# Patient Record
Sex: MALE | Race: WHITE | NOT HISPANIC OR LATINO | Employment: OTHER | URBAN - METROPOLITAN AREA
[De-identification: names, ages, dates, MRNs, and addresses within clinical notes are randomized per-mention and may not be internally consistent; named-entity substitution may affect disease eponyms.]

---

## 2020-01-22 ENCOUNTER — HISTORICAL (OUTPATIENT)
Dept: ADMINISTRATIVE | Facility: HOSPITAL | Age: 65
End: 2020-01-22

## 2020-01-22 LAB
ABS NEUT (OLG): 4.9 X10(3)/MCL (ref 2.1–9.2)
ALBUMIN SERPL-MCNC: 4.2 GM/DL (ref 3.4–5)
ALBUMIN/GLOB SERPL: 2.21 {RATIO} (ref 1.5–2.5)
ALP SERPL-CCNC: 67 UNIT/L (ref 38–126)
ALT SERPL-CCNC: 8 UNIT/L (ref 7–52)
APPEARANCE, UA: CLEAR
AST SERPL-CCNC: 13 UNIT/L (ref 15–37)
BACTERIA #/AREA URNS AUTO: NORMAL /HPF
BILIRUB SERPL-MCNC: 0.3 MG/DL (ref 0.2–1)
BILIRUB UR QL STRIP: NEGATIVE MG/DL
BILIRUBIN DIRECT+TOT PNL SERPL-MCNC: 0.1 MG/DL (ref 0–0.5)
BILIRUBIN DIRECT+TOT PNL SERPL-MCNC: 0.2 MG/DL
BUN SERPL-MCNC: 15 MG/DL (ref 7–18)
CALCIUM SERPL-MCNC: 8.8 MG/DL (ref 8.5–10)
CHLORIDE SERPL-SCNC: 104 MMOL/L (ref 98–107)
CHOLEST SERPL-MCNC: 125 MG/DL (ref 0–200)
CHOLEST/HDLC SERPL: 3 {RATIO}
CO2 SERPL-SCNC: 32 MMOL/L (ref 21–32)
COLOR UR: YELLOW
CREAT SERPL-MCNC: 1.07 MG/DL (ref 0.6–1.3)
ERYTHROCYTE [DISTWIDTH] IN BLOOD BY AUTOMATED COUNT: 13.2 % (ref 11.5–17)
GLOBULIN SER-MCNC: 2 GM/DL (ref 1.2–3)
GLUCOSE (UA): NEGATIVE MG/DL
GLUCOSE SERPL-MCNC: 92 MG/DL (ref 74–106)
HCT VFR BLD AUTO: 42.8 % (ref 42–52)
HDLC SERPL-MCNC: 41 MG/DL (ref 35–60)
HGB BLD-MCNC: 14.2 GM/DL (ref 14–18)
HGB UR QL STRIP: NEGATIVE UNIT/L
KETONES UR QL STRIP: NEGATIVE MG/DL
LDLC SERPL CALC-MCNC: 70 MG/DL (ref 0–129)
LEUKOCYTE ESTERASE UR QL STRIP: NEGATIVE UNIT/L
LYMPHOCYTES # BLD AUTO: 2.1 X10(3)/MCL (ref 0.6–3.4)
LYMPHOCYTES NFR BLD AUTO: 27 % (ref 13–40)
MCH RBC QN AUTO: 29.9 PG (ref 27–31.2)
MCHC RBC AUTO-ENTMCNC: 33 GM/DL (ref 32–36)
MCV RBC AUTO: 90 FL (ref 80–94)
MONOCYTES # BLD AUTO: 0.7 X10(3)/MCL (ref 0.1–1.3)
MONOCYTES NFR BLD AUTO: 9.4 % (ref 0.1–24)
NEUTROPHILS NFR BLD AUTO: 63.6 % (ref 47–80)
NITRITE UR QL STRIP.AUTO: NEGATIVE
PH UR STRIP: 5.5 [PH]
PLATELET # BLD AUTO: 199 X10(3)/MCL (ref 130–400)
PMV BLD AUTO: 10 FL (ref 9.4–12.4)
POTASSIUM SERPL-SCNC: 4.2 MMOL/L (ref 3.5–5.1)
PROT SERPL-MCNC: 6.1 GM/DL (ref 6.4–8.2)
PROT UR QL STRIP: NEGATIVE MG/DL
PSA SERPL-MCNC: 0.26 NG/ML (ref 0–4.5)
RBC # BLD AUTO: 4.75 X10(6)/MCL (ref 4.7–6.1)
RBC #/AREA URNS HPF: NORMAL /HPF
SODIUM SERPL-SCNC: 139 MMOL/L (ref 136–145)
SP GR UR STRIP: 1.02
SQUAMOUS EPITHELIAL, UA: NORMAL /LPF
TRIGL SERPL-MCNC: 71 MG/DL (ref 30–150)
UROBILINOGEN UR STRIP-ACNC: 0.2 MG/DL
VLDLC SERPL CALC-MCNC: 14.2 MG/DL
WBC # SPEC AUTO: 7.7 X10(3)/MCL (ref 4.5–11.5)
WBC #/AREA URNS AUTO: NORMAL /[HPF]

## 2020-02-28 ENCOUNTER — HISTORICAL (OUTPATIENT)
Dept: RADIOLOGY | Facility: HOSPITAL | Age: 65
End: 2020-02-28

## 2020-07-16 ENCOUNTER — HISTORICAL (OUTPATIENT)
Dept: ADMINISTRATIVE | Facility: HOSPITAL | Age: 65
End: 2020-07-16

## 2020-07-16 LAB
T3FREE SERPL-MCNC: 2.74 PG/ML (ref 1.45–3.48)
T4 FREE SERPL-MCNC: 1.21 NG/DL (ref 0.76–1.46)
TSH SERPL-ACNC: 1.06 MIU/ML (ref 0.35–4.94)

## 2021-01-26 ENCOUNTER — HISTORICAL (OUTPATIENT)
Dept: ADMINISTRATIVE | Facility: HOSPITAL | Age: 66
End: 2021-01-26

## 2021-01-26 LAB
ABS NEUT (OLG): 5 X10(3)/MCL (ref 2.1–9.2)
ALBUMIN SERPL-MCNC: 4.1 GM/DL (ref 3.4–5)
ALBUMIN/GLOB SERPL: 1.78 {RATIO} (ref 1.5–2.5)
ALP SERPL-CCNC: 73 UNIT/L (ref 38–126)
ALT SERPL-CCNC: 10 UNIT/L (ref 7–52)
APPEARANCE, UA: CLEAR
AST SERPL-CCNC: 18 UNIT/L (ref 15–37)
BACTERIA #/AREA URNS AUTO: NORMAL /HPF
BILIRUB SERPL-MCNC: 0.5 MG/DL (ref 0.2–1)
BILIRUB UR QL STRIP: NEGATIVE MG/DL
BILIRUBIN DIRECT+TOT PNL SERPL-MCNC: 0.1 MG/DL (ref 0–0.5)
BILIRUBIN DIRECT+TOT PNL SERPL-MCNC: 0.4 MG/DL
BUN SERPL-MCNC: 16 MG/DL (ref 7–18)
CALCIUM SERPL-MCNC: 9 MG/DL (ref 8.5–10.1)
CHLORIDE SERPL-SCNC: 101 MMOL/L (ref 98–107)
CHOLEST SERPL-MCNC: 113 MG/DL (ref 0–200)
CHOLEST/HDLC SERPL: 2.4 {RATIO}
CO2 SERPL-SCNC: 32 MMOL/L (ref 21–32)
COLOR UR: YELLOW
CREAT SERPL-MCNC: 0.99 MG/DL (ref 0.6–1.3)
ERYTHROCYTE [DISTWIDTH] IN BLOOD BY AUTOMATED COUNT: 13.2 % (ref 11.5–17)
EST CREAT CLEARANCE SER (OHS): 110.78 ML/MIN
EST. AVERAGE GLUCOSE BLD GHB EST-MCNC: 111 MG/DL
GLOBULIN SER-MCNC: 2.3 GM/DL (ref 1.2–3)
GLUCOSE (UA): NEGATIVE MG/DL
GLUCOSE SERPL-MCNC: 112 MG/DL (ref 74–106)
HBA1C MFR BLD: 5.5 % (ref 4.4–6.4)
HCT VFR BLD AUTO: 42.2 % (ref 42–52)
HDLC SERPL-MCNC: 47 MG/DL (ref 35–60)
HGB BLD-MCNC: 14.4 GM/DL (ref 14–18)
HGB UR QL STRIP: NEGATIVE UNIT/L
KETONES UR QL STRIP: NEGATIVE MG/DL
LDLC SERPL CALC-MCNC: 52 MG/DL (ref 0–129)
LEUKOCYTE ESTERASE UR QL STRIP: NEGATIVE UNIT/L
LYMPHOCYTES # BLD AUTO: 1.4 X10(3)/MCL (ref 0.6–3.4)
LYMPHOCYTES NFR BLD AUTO: 19.3 % (ref 13–40)
MCH RBC QN AUTO: 31.2 PG (ref 27–31.2)
MCHC RBC AUTO-ENTMCNC: 34 GM/DL (ref 32–36)
MCV RBC AUTO: 91 FL (ref 80–94)
MONOCYTES # BLD AUTO: 0.7 X10(3)/MCL (ref 0.1–1.3)
MONOCYTES NFR BLD AUTO: 9.8 % (ref 0.1–24)
NEUTROPHILS NFR BLD AUTO: 70.9 % (ref 47–80)
NITRITE UR QL STRIP.AUTO: NEGATIVE
PH UR STRIP: 7 [PH]
PLATELET # BLD AUTO: 184 X10(3)/MCL (ref 130–400)
PMV BLD AUTO: 10.2 FL (ref 9.4–12.4)
POTASSIUM SERPL-SCNC: 4.4 MMOL/L (ref 3.5–5.1)
PROT SERPL-MCNC: 6.4 GM/DL (ref 6.4–8.2)
PROT UR QL STRIP: NEGATIVE MG/DL
PSA SERPL-MCNC: 0.2 NG/ML (ref 0–4.5)
RBC # BLD AUTO: 4.62 X10(6)/MCL (ref 4.7–6.1)
RBC #/AREA URNS HPF: NORMAL /HPF
SODIUM SERPL-SCNC: 140 MMOL/L (ref 136–145)
SP GR UR STRIP: 1.02
SQUAMOUS EPITHELIAL, UA: NORMAL /LPF
TRIGL SERPL-MCNC: 68 MG/DL (ref 30–150)
TSH SERPL-ACNC: 0.99 MIU/ML (ref 0.35–4.94)
UROBILINOGEN UR STRIP-ACNC: 1 MG/DL
VLDLC SERPL CALC-MCNC: 13.6 MG/DL
WBC # SPEC AUTO: 7.1 X10(3)/MCL (ref 4.5–11.5)
WBC #/AREA URNS AUTO: NORMAL /[HPF]

## 2021-03-02 ENCOUNTER — HISTORICAL (OUTPATIENT)
Dept: RADIOLOGY | Facility: HOSPITAL | Age: 66
End: 2021-03-02

## 2022-03-08 ENCOUNTER — HISTORICAL (OUTPATIENT)
Dept: ADMINISTRATIVE | Facility: HOSPITAL | Age: 67
End: 2022-03-08

## 2022-03-08 ENCOUNTER — HISTORICAL (OUTPATIENT)
Dept: RADIOLOGY | Facility: HOSPITAL | Age: 67
End: 2022-03-08

## 2022-04-10 ENCOUNTER — HISTORICAL (OUTPATIENT)
Dept: ADMINISTRATIVE | Facility: HOSPITAL | Age: 67
End: 2022-04-10

## 2022-04-28 VITALS
WEIGHT: 227.94 LBS | BODY MASS INDEX: 33.76 KG/M2 | DIASTOLIC BLOOD PRESSURE: 68 MMHG | HEIGHT: 69 IN | SYSTOLIC BLOOD PRESSURE: 152 MMHG

## 2022-05-03 NOTE — HISTORICAL [facility]ER
This is a historical note converted from Tori. Formatting and pictures may have been removed.  Please reference Tori for original formatting and attached multimedia. Chief Complaint  CPX/FASTING  History of Present Illness  Patient is here today for?new patient wellness CPX.? He was previously seen?a physician, Dr. Yang, and?[place]?who recently?retired unexpectedly.? I informed him that he will need to be?referred to pain management?for future prescribing of his?opioids and Lyrica. ?He is in agreement with this. ?He has had multiple?neck and back surgeries?and has been disabled since 2007 as result of these injuries.? He brought a?old chart?with records.? He also had a CVA?at age 58?which left some residual weakness.? He is taken an aspirin a day. ?He does see Dr. Walker?as his cardiologist.? I have been his wifes?physician for many years.  Review of Systems  GENERAL:?no? unexplained wt ?gain?9 lbs over holidays , no fever, fatigue, chills, night sweats or ?weakness  HEENT: no?? sore throat, ?ear pain, ?sinus pressure, ?nasal congestion, or ?rhinorrhea  VISION:?no ?vision changes, ?glaucoma, cataracts, reading glasses  CARDIAC: no? chest pain,??palpitations,?Dyspnea on exertion, ?orthopnea--Dr. Walker  RESPIRATORY:?no??cough,?wheezing, sputum production or?SOB--hx of COPD  GI: no???abdominal pain, n&v,+ ?constipation due to pain meds ,no ?diarrhea,??blood in stool or_?family history of colon cancer?_  :?no? dysuria, ?hematuria, ?frequency, urgency, ?incontinence,? testicular pain/swelling,?_?family history of prostate cancer_  [facility]:? no? myalgia, ?weakness, edema,?+ neck and back ?arthralgia, or ?joint effusion  SKIN:? No?rash, hives,?itching or?sores  NEURO:? No?headaches, + numbness and?tingling and pain in feet ,? no weakness, or ?dizziness  PSYCH:? No anxiety, depression, ?irritability, ?suicidal ideation or hallucinations  ENDO:? No ?polyuria, ?polydipsia, ?polyphagia  HEME:? No Bruising,  lymphadenopathy, bleeding disorders ?or?signs of anemia  Physical Exam  Vitals & Measurements  HR:?70(Peripheral)? BP:?112/62?  HT:?176.5?cm? WT:?104.1?kg? BMI:?33.42?  GENERAL: NAD, alert and oriented x 3  SKIN:? no rash or abnormal appearing skin lesions  HEENT:? PERRLA, EOMI, mouth wnl, throat wnl, EAC and TM wnl bilaterally  NECK:?? no lymphadenopathy, no thyroid abnormalities palpable  CHEST:? CTA bilaterally?mild bilateral wheezes, no crackles or rubs  CARDIAC:? RRR, no murmurs audible  ABDOMEN:? Soft, nontender, nondistended, NBSx4,?no rebound or guarding, no HSM  EXTREMITIES:? no clubbing, cyanosis, or edema.?? +1 DP/PT pulse bilaterally  NEURO:? no sensory or motor deficits noted. CN II-XII intact. Gait wnl.?  GENITAL: normal testes, no hernia  RECTAL: no hemorrhoids or fissures, prostate enlarged but smooth surface  Assessment/Plan  1.?Wellness examination?Z00.00  on ,?DC smoking CBC, CMP, FLP, PSA, U/A, colonoscopy??Dr Cunningham?get report,? set up low dose CT scan--30 pk yr hx, Encourage pt to increase cardiovascular exercise and attempt to obtain at least 150 minutes of moderate aerobic exercise per week or 75 minutes of vigorous aerobic exercise weekly.  Ordered:  Clinic Follow-Up Wellness, *Est. 01/22/21 3:00:00 CST, Order for future visit, Wellness examination  H/O: CVA (cerebrovascular accident)  Hypercholesterolemia  Chronic neck and back pain, HLink AFP   Medicare Subsequent Wellness PC, Wellness examination  H/O: CVA (cerebrovascular accident)  Hypercholesterolemia  Chronic neck and back pain, HLINK AMB - AFP, 01/22/20 10:19:00 CST  Schedule Diagnostics Study, Low dose CT of lung, [facility] program, 01/22/20 10:20:00 CST, Wellness examination  ?  2.?Screening for prostate cancer?Z12.5  PSA  Ordered:  Prostate Specific Antigen, Routine collect, 01/22/20 10:26:00 CST, Blood, Stop date 01/22/20 10:26:00 CST, Lab Collect, Screening for prostate cancer, 01/22/20 10:26:00  CST  ?  3.?Immunization due?Z23  flu shot -got  ?  4.?H/O: CVA (cerebrovascular accident)?Z86.73  Continue  mg daily  Ordered:  Automated Diff, Routine collect, 01/22/20 10:26:00 CST, Blood, Collected, Stop date 01/22/20 10:26:00 CST, Lab Collect, H/O: CVA (cerebrovascular accident), 01/22/20 10:26:00 CST  CBC w/ Auto Diff, Routine collect, 01/22/20 10:26:00 CST, Blood, Stop date 01/22/20 10:26:00 CST, Lab Collect, H/O: CVA (cerebrovascular accident), 01/22/20 10:26:00 CST  Clinic Follow-Up Wellness, *Est. 01/22/21 3:00:00 CST, Order for future visit, Wellness examination  H/O: CVA (cerebrovascular accident)  Hypercholesterolemia  Chronic neck and back pain, HLink AFP  Comprehensive Metabolic Panel, Routine collect, 01/22/20 10:26:00 CST, Blood, Stop date 01/22/20 10:26:00 CST, Lab Collect, H/O: CVA (cerebrovascular accident)  Hypercholesterolemia, 01/22/20 10:26:00 CST   Medicare Subsequent Wellness PC, Wellness examination  H/O: CVA (cerebrovascular accident)  Hypercholesterolemia  Chronic neck and back pain, HLINK AMB - AFP, 01/22/20 10:19:00 CST  Urinalysis Complete no reflex, Routine collect, Urine, 01/22/20 10:25:00 CST, Stop date 01/22/20 10:26:00 CST, Nurse collect, H/O: CVA (cerebrovascular accident)  Hypercholesterolemia  Chronic neck and back pain  ?  5.?Hypercholesterolemia?E78.00  ?continue atorvastatin 20 mg  Ordered:  Clinic Follow-Up Wellness, *Est. 01/22/21 3:00:00 CST, Order for future visit, Wellness examination  H/O: CVA (cerebrovascular accident)  Hypercholesterolemia  Chronic neck and back pain, HLink AFP  Comprehensive Metabolic Panel, Routine collect, 01/22/20 10:26:00 CST, Blood, Stop date 01/22/20 10:26:00 CST, Lab Collect, H/O: CVA (cerebrovascular accident)  Hypercholesterolemia, 01/22/20 10:26:00 CST   Medicare Subsequent Wellness PC, Wellness examination  H/O: CVA (cerebrovascular accident)  Hypercholesterolemia  Chronic neck and back pain, HLINK AMB -  AFP, 01/22/20 10:19:00 CST  Lipid Panel, Routine collect, 01/22/20 10:26:00 CST, Blood, Stop date 01/22/20 10:26:00 CST, Lab Collect, Hypercholesterolemia, 01/22/20 10:26:00 CST  Urinalysis Complete no reflex, Routine collect, Urine, 01/22/20 10:25:00 CST, Stop date 01/22/20 10:26:00 CST, Nurse collect, H/O: CVA (cerebrovascular accident)  Hypercholesterolemia  Chronic neck and back pain  ?  6.?Chronic neck and back pain?M54.2  ?refer to Pain management to establish--patient had withdrawal?in the past with Lyrica?and have agreed to refill?for short period of time until he is able to get established with pain management.  Ordered:  Clinic Follow-Up Wellness, *Est. 01/22/21 3:00:00 CST, Order for future visit, Wellness examination  H/O: CVA (cerebrovascular accident)  Hypercholesterolemia  Chronic neck and back pain, ink [facility]  External Referral, chronic neck/back pain, pain management, jaycee Dykes md, Anesthesiology and pain consultant [phone], 01/22/20 10:20:00 CST, Chronic neck and back pain   Medicare Subsequent Wellness PC, Wellness examination  H/O: CVA (cerebrovascular accident)  Hypercholesterolemia  Chronic neck and back pain, INK AMB - AFP, 01/22/20 10:19:00 CST  Office/Outpatient Visit Level 3 Established 44256 PC, Chronic neck and back pain  Chronic constipation, INK AMB - AFP, 01/22/20 10:31:00 CST  Urinalysis Complete no reflex, Routine collect, Urine, 01/22/20 10:25:00 CST, Stop date 01/22/20 10:26:00 CST, Nurse collect, H/O: CVA (cerebrovascular accident)  Hypercholesterolemia  Chronic neck and back pain  ?  7.?Chronic constipation?K59.09  ?Due to narcotics--refill Linzess  Ordered:  Office/Outpatient Visit Level 3 Established [id number] PC, Chronic neck and back pain  Chronic constipation, HLINK AMB - AFP, 01/22/20 10:31:00 CST  ?  Orders:  atorvastatin, 20 mg = 1 tab(s), Oral, Daily, # 30 tab(s), 11 Refill(s), Pharmacy: [facility], 176.5, cm, Height/Length Dosing, 01/22/20  9:52:00 CST, 104.1, kg, Weight Dosing, 01/22/20 9:52:00 CST  linaclotide, 145 mcg = 1 cap(s), Oral, Daily, # 30 cap(s), 11 Refill(s), Pharmacy: [email or web address], 176.5, cm, Height/Length Dosing, 01/22/20 9:52:00 CST, 104.1, kg, Weight Dosing, 01/22/20 9:52:00 CST  Referrals  External Referral, chronic neck/back pain, pain management, jaycee Dykes md, Anesthesiology and pain consultant [phone], 01/22/20 10:20:00 CST, Chronic neck and back pain  Clinic Follow-Up Wellness, *Est. 01/22/21 3:00:00 CST, Order for future visit, Wellness examination  H/O: CVA (cerebrovascular accident)  Hypercholesterolemia  Chronic neck and back pain, HLink AFP   Problem List/Past Medical History  Ongoing  ADL - activity of daily living  Bipolar  CVA (cerebral vascular accident)  Sleep apnea  Tobacco user  Historical  Anticoagulant therapy  Arthritis  Back pain  can lie flat  can walk 2 blocks briskly w/o CP/SOB  Chronic neck pain  Chronic neuropathy with no known cause or association  Constipation  disc disease  Hypercholesterolemia  Hypertension  numbness/tingling fingertips & tips of toes  orthostatic hypotension  Procedure/Surgical History  Excision of Left Pelvic Bone, Percutaneous Approach (01/29/2018)  Excision of Lumbar Vertebral Disc, Open Approach (01/29/2018)  Fusion of 2 or more Lumbar Vertebral Joints with Autologous Tissue Substitute, Posterior Approach, Posterior Column, Open Approach (01/29/2018)  Fusion of Lumbar Vertebral Joint with Interbody Fusion Device, Posterior Approach, Anterior Column, Open Approach (01/29/2018)  Monitoring of Peripheral Nervous Electrical Activity, Intraoperative, External Approach (01/29/2018)  Release Lumbar Nerve, Open Approach (01/29/2018)  Fusion of lumbar spine (2018)  Colonoscopy (06/12/2017)  UMBILICAL HERNIA REPAIR (06/30/2015)  REVISION C3-C4 ACDF (03/25/2015)  Magnetic resonance imaging of brain and brain stem (02/06/2014)  Magnetic resonance imaging of other and unspecified sites  (02/06/2014)  CERVICAL SPINAL FUSION (2009)  neck surgery X4  RIGHT KNEE RECONSTRUCTION  RIGHT KNEE REPLACEMENT  RIGHT ROTATOR CUFF REPAIR   Medications  aspirin 325 mg oral tablet, 325 mg= 1 tab(s), Oral, Daily  atorvastatin 20 mg oral tablet, 20 mg= 1 tab(s), Oral, Daily, 11 refills  Linzess 145 mcg oral capsule, 145 mcg= 1 cap(s), Oral, Daily, 11 refills  LYRICA 150 MG CAPS, 150 mg= 1 cap(s), Oral, TID  morphine 30 mg/8 to 12 hr oral tablet, extended release, 30 mg= 1 tab(s), Oral, BID  NABUMETONE 750 MG TABS, 750 mg= 1 tab(s), Oral, BID  OXYCODONE HCL 30 MG TABS, 30 mg= 1 tab(s), Oral, q4hr  Allergies  Demerol HCl  Flexeril  Social History  Abuse/Neglect  No, 01/22/2020  Alcohol - Medium Risk, 02/06/2014  Current, 01/22/2020  Employment/School  Disabled, Work/School description: SINCE 2007 due to neck and back., 01/22/2020  Home/Environment  Lives with Children, Significant other., 01/22/2020  Substance Use  Never, 01/25/2018  Tobacco - High Risk, 02/06/2014  10 or more cigarettes (1/2 pack or more)/day in last 30 days, N/A, 19 Years (Age started)., 01/22/2020  Current every day smoker, 42 year(s)., 01/25/2018  Family History  Alcoholism.: Mother.  Diabetes mellitus type 2: Sister.  Immunizations  Vaccine Date Status   influenza virus vaccine, inactivated 02/07/2014 Given   poliovirus vaccine, live, trivalent 07/10/1961 Recorded   poliovirus vaccine, live, trivalent 07/15/1957 Recorded   poliovirus vaccine, live, trivalent 07/01/1957 Recorded   Health Maintenance  Health Maintenance  ???Pending?(in the next year)  ??? ??OverDue  ??? ? ? ?Coronary Artery Disease Maintenance-Antiplatelet Agent Prescribed due??and every?  ??? ? ? ?Coronary Artery Disease Maintenance-Lipid Lowering Therapy due??and every?  ??? ? ? ?Diabetes Screening due??and every?  ??? ? ? ?Hypertension Management-BMP due??01/31/19??and every 1??year(s)  ??? ? ? ?Smoking Cessation due??01/01/20??Variable frequency  ??? ??Due?  ??? ? ? ?Alcohol  Misuse Screening due??01/01/20??and every 1??year(s)  ??? ? ? ?ADL Screening due??01/22/20??and every 1??year(s)  ??? ? ? ?Colorectal Screening due??01/22/20??and every 10??year(s)  ??? ? ? ?Lung Cancer Screening due??01/22/20??and every 1??year(s)  ??? ? ? ?Tetanus Vaccine due??01/22/20??and every 10??year(s)  ??? ? ? ?Zoster Vaccine due??01/22/20??and every 100??year(s)  ??? ??Due In Future?  ??? ? ? ?Smoking Cessation (Coronary Artery Disease) not due until??01/29/20??and every 2??year(s)  ??? ? ? ?Obesity Screening not due until??01/01/21??and every 1??year(s)  ??? ? ? ?Blood Pressure Screening not due until??01/21/21??and every 1??year(s)  ??? ? ? ?Body Mass Index Check not due until??01/21/21??and every 1??year(s)  ??? ? ? ?Hypertension Management-Blood Pressure not due until??01/21/21??and every 1??year(s)  ???Satisfied?(in the past 1 year)  ??? ??Satisfied?  ??? ? ? ?Aspirin Therapy for CVD Prevention on??01/22/20.  ??? ? ? ?Blood Pressure Screening on??01/22/20.??Satisfied by Lilliana Aldana CMA  ??? ? ? ?Body Mass Index Check on??01/22/20.??Satisfied by Lilliana Aldana CMA  ??? ? ? ?Colorectal Screening on??12/31/19.??Satisfied by Caro Dempsey RN  ??? ? ? ?Coronary Artery Disease Maintenance-Lipid Lowering Therapy on??01/22/20.??Satisfied by Sergio Wu MD  ??? ? ? ?Hypertension Management-Blood Pressure on??01/22/20.??Satisfied by Lilliana Aldana CMA  ??? ? ? ?Obesity Screening on??01/22/20.??Satisfied by Lilliana Aldana CMA  ?

## 2022-05-03 NOTE — HISTORICAL [facility] CERNER
This is a historical note converted from Tori. Formatting and pictures may have been removed.  Please reference Tori for original formatting and attached multimedia. Chief Complaint  PT HERE TO DISCUSS THYROID NODULES, THYROID MASS FOUND ON CAROTID U/S.  History of Present Illness  Pt was referred here by his cardiologist due to incidental finding of mass on thyroid gland detected on carotid u/s.? He did bring?a copy?of a report?from a?thyroid uptake scan?performed in?December 2012?which showed a?cold nodule?along the inferior aspect of the left?lobe of the thyroid.? He underwent a FNA?of left thyroid nodule?biopsy returned?consistent with hyperplastic nodule.? As far he can remember, he was not instructed to follow-up?and no further treatment was?required.? He denies?any symptoms related to?this thyroid nodule.  Review of Systems  GENERAL:?no? fatigue,?+ weight loss 7lbs ,no ?diaphoresis, hot flashes  HEENT:? no difficulty swallowing, hoarse voice or neck masses  RESP:?noSOB,? ?no?cough,?+ frequently clears throat?  CARDIAC:? ?no? chest pain,? ?no? palpations  GI:? ?no? N&V,? ?no? abd pain  NEURO:? ?no? headache,? ?no? dizziness  Physical Exam  Vitals & Measurements  T:?36.4? ?C (Oral)? HR:?62(Peripheral)? BP:?118/64?  HT:?176.5?cm? WT:?100.4?kg? BMI:?32.23?  GENERAL:? NAD  HEENT:? PERRLA, EOMI,?no palpable mass on exam  ?  Assessment/Plan  1.?Thyroid mass?E07.9  TSH, Free T3 and T4--set up u/s of thyroid [facility] in [place]--May need Thyroid bx again--  ?  Pt will try to see if can find old CT or u/s from 2014 for size of nodule comparison?since not stated on?nuclear scan.  Ordered:  Free T4, Routine collect, 07/16/20 15:23:00 CDT, Blood, Order for future visit, Stop date 07/16/20 15:23:00 CDT, Lab Collect, Thyroid mass, 07/16/20 15:23:00 CDT  Lab Collection Request, 07/16/20 15:23:00 CDT, AI AMB - AFP, 07/16/20 15:23:00 CDT, Thyroid mass  Office/Outpatient Visit Level 3 Established [id number] PC, Thyroid mass, AI  AMB - AFP, 07/16/20 15:24:00 CDT  Schedule Diagnostics Study, u/s thyroid, [facility] imaging in [place], 07/16/20 15:24:00 CDT, Thyroid mass  T3 Free, Routine collect, 07/16/20 15:23:00 CDT, Blood, Order for future visit, Stop date 07/16/20 15:23:00 CDT, Lab Collect, Thyroid mass, 07/16/20 15:23:00 CDT  Thyroid Stimulating Hormone, Routine collect, 07/16/20 15:23:00 CDT, Blood, Order for future visit, Stop date 07/16/20 15:23:00 CDT, Lab Collect, Thyroid mass, 07/16/20 15:23:00 CDT  ?  Orders:  Clinic Follow-up PRN, 07/16/20 15:24:00 CDT, HLINK AMB - AFP, Future Order  Referrals  Clinic Follow-up PRN, 07/16/20 15:24:00 CDT, HLINK AMB - AFP, Future Order   Problem List/Past Medical History  Ongoing  ADL - activity of daily living  Bipolar  CVA (cerebral vascular accident)  Sleep apnea  Tobacco user  Historical  Anticoagulant therapy  Arthritis  Back pain  can lie flat  can walk 2 blocks briskly w/o CP/SOB  Chronic neck pain  Chronic neuropathy with no known cause or association  Constipation  disc disease  Hypercholesterolemia  Hypertension  numbness/tingling fingertips & tips of toes  orthostatic hypotension  Procedure/Surgical History  Excision of Left Pelvic Bone, Percutaneous Approach (01/29/2018)  Excision of Lumbar Vertebral Disc, Open Approach (01/29/2018)  Fusion of 2 or more Lumbar Vertebral Joints with Autologous Tissue Substitute, Posterior Approach, Posterior Column, Open Approach (01/29/2018)  Fusion of Lumbar Vertebral Joint with Interbody Fusion Device, Posterior Approach, Anterior Column, Open Approach (01/29/2018)  Monitoring of Peripheral Nervous Electrical Activity, Intraoperative, External Approach (01/29/2018)  Release Lumbar Nerve, Open Approach (01/29/2018)  Fusion of lumbar spine (2018)  Colonoscopy (06/12/2017)  UMBILICAL HERNIA REPAIR (06/30/2015)  REVISION C3-C4 ACDF (03/25/2015)  Magnetic resonance imaging of brain and brain stem (02/06/2014)  Magnetic resonance imaging of other and unspecified  sites (02/06/2014)  CERVICAL SPINAL FUSION (2009)  neck surgery X4  RIGHT KNEE RECONSTRUCTION  RIGHT KNEE REPLACEMENT  RIGHT ROTATOR CUFF REPAIR   Medications  aspirin 325 mg oral tablet, 325 mg= 1 tab(s), Oral, Daily  atorvastatin 20 mg oral tablet, 20 mg= 1 tab(s), Oral, Daily, 11 refills  Linzess 145 mcg oral capsule, 145 mcg= 1 cap(s), Oral, Daily, 11 refills  LYRICA 150 MG CAPS, 150 mg= 1 cap(s), Oral, TID  morphine 30 mg/8 to 12 hr oral tablet, extended release, 30 mg= 1 tab(s), Oral, BID  NABUMETONE 750 MG TABS, 750 mg= 1 tab(s), Oral, BID  oxyCODONE 10 mg oral tablet, 10 mg= 1 tab(s), Oral, QID  Allergies  Demerol HCl  Flexeril  Social History  Abuse/Neglect  No, 07/16/2020  No, 01/22/2020  Alcohol - Medium Risk, 02/06/2014  Current, 01/22/2020  Employment/School  Disabled, Work/School description: SINCE 2007 due to neck and back., 01/22/2020  Home/Environment  Lives with Children, Significant other., 01/22/2020  Substance Use  Never, 01/25/2018  Tobacco - High Risk, 02/06/2014  10 or more cigarettes (1/2 pack or more)/day in last 30 days, N/A, 07/16/2020  10 or more cigarettes (1/2 pack or more)/day in last 30 days, N/A, 19 Years (Age started)., 01/22/2020  Current every day smoker, 42 year(s)., 01/25/2018  Family History  Alcoholism.: Mother.  Diabetes mellitus type 2: Sister.  Immunizations  Vaccine Date Status   influenza virus vaccine, inactivated 02/07/2014 Given   poliovirus vaccine, live, trivalent 07/10/1961 Recorded   poliovirus vaccine, live, trivalent 07/15/1957 Recorded   poliovirus vaccine, live, trivalent 07/01/1957 Recorded   Health Maintenance  Health Maintenance  ???Pending?(in the next year)  ??? ??OverDue  ??? ? ? ?Depression Screening due??10/15/19??and every 1??year(s)  ??? ? ? ?Smoking Cessation due??01/01/20??Variable frequency  ??? ? ? ?Alcohol Misuse Screening due??01/02/20??and every 1??year(s)  ??? ??Due?  ??? ? ? ?ADL Screening due??07/16/20??and every 1??year(s)  ??? ? ?  ?Influenza Vaccine due??07/16/20??and every?  ??? ? ? ?Tetanus Vaccine due??07/16/20??and every 10??year(s)  ??? ? ? ?Zoster Vaccine due??07/16/20??and every?  ??? ??Due In Future?  ??? ? ? ?Obesity Screening not due until??01/01/21??and every 1??year(s)  ??? ? ? ?Blood Pressure Screening not due until??01/21/21??and every 1??year(s)  ??? ? ? ?Body Mass Index Check not due until??01/21/21??and every 1??year(s)  ??? ? ? ?Hypertension Management-Blood Pressure not due until??01/21/21??and every 1??year(s)  ??? ? ? ?Aspirin Therapy for CVD Prevention not due until??01/22/21??and every 1??year(s)  ??? ? ? ?Medicare Annual Wellness Exam not due until??01/22/21??and every 1??year(s)  ??? ? ? ?Lung Cancer Screening not due until??02/28/21??and every 1??year(s)  ??? ? ? ?Diabetes Screening not due until??06/10/21??and every 1??year(s)  ??? ? ? ?Hypertension Management-BMP not due until??06/10/21??and every 1??year(s)  ???Satisfied?(in the past 1 year)  ??? ??Satisfied?  ??? ? ? ?Aspirin Therapy for CVD Prevention on??01/22/20.  ??? ? ? ?Blood Pressure Screening on??07/16/20.??Satisfied by Lilliana Aldana CMA  ??? ? ? ?Body Mass Index Check on??07/16/20.??Satisfied by Lilliana Aldana CMA.  ??? ? ? ?Coronary Artery Disease Maintenance-Lipid Lowering Therapy on??01/22/20.??Satisfied by Sergio Wu MD  ??? ? ? ?Diabetes Screening on??06/10/20.??Satisfied by Claudia Farnsworth  ??? ? ? ?Hypertension Management-BMP on??06/10/20.??Satisfied by Claudia Farnsworth  ??? ? ? ?Hypertension Management-Blood Pressure on??07/16/20.??Satisfied by Lilliana Aldana CMA.  ??? ? ? ?Lipid Screening on??01/22/20.??Satisfied by Bakari Escobar  ??? ? ? ?Lung Cancer Screening on??02/28/20.??Satisfied by Esvin Marte Jr.  ??? ? ? ?Medicare Annual Wellness Exam on??01/22/20.??Satisfied by Sergio Wu MD  ??? ? ? ?Obesity Screening on??07/16/20.??Satisfied by Lilliana Aldana CMA  ?

## 2022-05-03 NOTE — HISTORICAL [facility] CERNER
This is a historical note converted from Tori. Formatting and pictures may have been removed.  Please reference Tori for original formatting and attached multimedia. Chief Complaint  CPX/FASTING  History of Present Illness  Patient is here today for wellness CPX. ?He is tolerating all medications without side effects.? He continues to see?a pain?management?physician?who is treating him for chronic?back and neck pain.? He reports that Linzess is working good for chronic constipation due to his narcotics.? He does continue to smoke?approximately 1/2 pack a day.? He has a 30+ pack year smoking history. ?Low-dose CT scan of lung last year was negative?and he is willing to schedule it again this year.  Review of Systems  GENERAL:?no? unexplained wt ?gain?8lbs , no fever, fatigue, chills, night sweats or ?weakness  HEENT: no?? sore throat, ?R ear noise few months ago but resolved , ?no sinus pressure, ?nasal congestion, or ?rhinorrhea  VISION:?no ?vision changes, ?glaucoma, cataracts, + glasses  CARDIAC: no? chest pain,??palpitations,?Dyspnea on exertion, ?orthopnea  RESPIRATORY:?no??cough,?wheezing, sputum production or?SOB  GI: no???abdominal pain, n&v,?+ constipation on Linzess, no ?diarrhea,??blood in stool or_?no family history of colon cancer?_  :?no? dysuria, ?hematuria, ?frequency, urgency, ?incontinence,? testicular pain/swelling,?_?no family history of prostate cancer_  [facility]:? no? myalgia, ?weakness, edema,? + chronic LBP/Neck pain arthralgia, or? no ?joint effusion  SKIN:? No?rash, hives,?itching or?sores--few AK scalp--pt will set up derm apt  NEURO:? No?headaches, numbness, tingling,? weakness, or ?dizziness  PSYCH:??mild ?anxiety, no depression, ?irritability, ?suicidal ideation or hallucinations  ENDO:? No ?polyuria, ?polydipsia, ?polyphagia  HEME:? No Bruising, lymphadenopathy, bleeding disorders ?or?signs of anemia  Physical Exam  Vitals & Measurements  T:?36.6? ?C (Oral)? HR:?70(Peripheral)?  BP:?134/62?  HT:?176.50?cm? WT:?103.900?kg? BMI:?33.35?  GENERAL: NAD, alert and oriented x 3  SKIN:? no rash or abnormal appearing skin lesions  HEENT:? PERRLA, EOMI, mouth wnl, throat wnl, EAC and TM wnl bilaterally  NECK:? FROM, no lymphadenopathy, no thyroid abnormalities palpable  CHEST:? CTA bilaterally no wheezes, crackles or rubs  CARDIAC:? RRR, no murmurs audible  ABDOMEN:? Soft, nontender, nondistended, NBSx4,?no rebound or guarding, no HSM  EXTREMITIES:? no clubbing, cyanosis, or edema.? joints wnl. +2 DP/PT pulse bilaterally  NEURO:? no sensory or motor deficits noted. CN II-XII intact. Gait wnl.?  GENITAL: normal testes, no hernia  RECTAL: no hemorrhoids or fissures, prostate mildly enlarged, smooth surface  Assessment/Plan  1.?Wellness examination?Z00.00  d/c smoking, ?CBC, CMP, FLP, PSA, U/A, TSH, ?colonoscopy Dr Cunningham 2017 due 2022, low dose CT of lung after feb 28th , Encourage pt to increase cardiovascular exercise and attempt to obtain at least 150 minutes of moderate aerobic exercise per week or 75 minutes of vigorous aerobic exercise weekly.  Ordered:  Clinic Follow-Up Wellness, *Est. 01/26/22 3:00:00 CST, Order for future visit, Wellness examination  Hypercholesterolemia  Constipation  H/O: CVA (cerebrovascular accident)  Chronic neck and back pain, [facility] AFP  Lab Collection Request, 01/26/21 8:36:00 CST, [facility] AMB - AFP, 01/26/21 8:36:00 CST, Wellness examination  Hypercholesterolemia  Constipation  H/O: CVA (cerebrovascular accident)  Chronic neck and back pain  Medicare Annual Wellness- Subsequent  PC, Wellness examination  Hypercholesterolemia  Constipation  H/O: CVA (cerebrovascular accident)  Chronic neck and back pain, [facility] AMB - AFP, 01/26/21 8:36:00 CST  Schedule Diagnostics Study, low dose CT lung, [facility] program--after 2/28/2021--did last year, 01/26/21 8:39:00 CST, Wellness examination  ?  2.?Prostate cancer screening?Z12.5  PSA  Ordered:  Prostate Specific Antigen,  Routine collect, 01/26/21 8:36:00 CST, Blood, Order for future visit, Stop date 01/26/21 8:36:00 CST, Lab Collect, Prostate cancer screening, 01/26/21 8:36:00 CST  ?  3.?Encounter for immunization?Z23  prevnar, flu shot  Ordered:  Influenza Virus Vaccine, Inactivated, 0.5 mL, IM, Once-Unscheduled, first dose 01/26/21 8:36:00 CST  pneumococcal 13-valent conjugate vaccine, 0.5 mL, form: Injection, IM, Once, first dose 01/26/21 8:36:00 CST, stop date 01/26/21 8:36:00 CST  ?  4.?Hypercholesterolemia?E78.00  ?continue atorvastatin 20 mg  Ordered:  Clinic Follow-Up Wellness, *Est. 01/26/22 3:00:00 CST, Order for future visit, Wellness examination  Hypercholesterolemia  Constipation  H/O: CVA (cerebrovascular accident)  Chronic neck and back pain, [facility] AFP  Comprehensive Metabolic Panel, Routine collect, 01/26/21 8:36:00 CST, Blood, Order for future visit, Stop date 01/26/21 8:36:00 CST, Lab Collect, Hypercholesterolemia  Constipation  H/O: CVA (cerebrovascular accident), 01/26/21 8:36:00 CST  Lab Collection Request, 01/26/21 8:36:00 CST, HLINK AMB - AFP, 01/26/21 8:36:00 CST, Wellness examination  Hypercholesterolemia  Constipation  H/O: CVA (cerebrovascular accident)  Chronic neck and back pain  Lipid Panel, Routine collect, 01/26/21 8:36:00 CST, Blood, Order for future visit, Stop date 01/26/21 8:36:00 CST, Lab Collect, Hypercholesterolemia  H/O: CVA (cerebrovascular accident), 01/26/21 8:36:00 CST  Medicare Annual Wellness- Subsequent  PC, Wellness examination  Hypercholesterolemia  Constipation  H/O: CVA (cerebrovascular accident)  Chronic neck and back pain, [facility]LINK AMB - AFP, 01/26/21 8:36:00 CST  Urinalysis no Reflex, Routine collect, Urine, Order for future visit, 01/26/21 8:36:00 CST, Stop date 01/26/21 8:36:00 CST, Nurse collect, H/O: CVA (cerebrovascular accident)  Constipation  Hypercholesterolemia  ?  5.?Constipation?K59.00  ?due to narcotics--continue Linzess 145 mg  Ordered:  ULISSES w/  Auto Diff, Routine collect, 01/26/21 8:36:00 CST, Blood, Order for future visit, Stop date 01/26/21 8:36:00 CST, Lab Collect, Constipation  H/O: CVA (cerebrovascular accident)  Chronic neck and back pain, 01/26/21 8:36:00 CST  Clinic Follow-Up Wellness, *Est. 01/26/22 3:00:00 CST, Order for future visit, Wellness examination  Hypercholesterolemia  Constipation  H/O: CVA (cerebrovascular accident)  Chronic neck and back pain, HLink AFP  Comprehensive Metabolic Panel, Routine collect, 01/26/21 8:36:00 CST, Blood, Order for future visit, Stop date 01/26/21 8:36:00 CST, Lab Collect, Hypercholesterolemia  Constipation  H/O: CVA (cerebrovascular accident), 01/26/21 8:36:00 CST  Lab Collection Request, 01/26/21 8:36:00 CST, HLINK AMB - AFP, 01/26/21 8:36:00 CST, Wellness examination  Hypercholesterolemia  Constipation  H/O: CVA (cerebrovascular accident)  Chronic neck and back pain  Medicare Annual Wellness- Subsequent  PC, Wellness examination  Hypercholesterolemia  Constipation  H/O: CVA (cerebrovascular accident)  Chronic neck and back pain, HLINK AMB - AFP, 01/26/21 8:36:00 CST  Urinalysis no Reflex, Routine collect, Urine, Order for future visit, 01/26/21 8:36:00 CST, Stop date 01/26/21 8:36:00 CST, Nurse collect, H/O: CVA (cerebrovascular accident)  Constipation  Hypercholesterolemia  ?  6.?H/O: CVA (cerebrovascular accident)?Z86.73  ?continue ASA daily  Ordered:  CBC w/ Auto Diff, Routine collect, 01/26/21 8:36:00 CST, Blood, Order for future visit, Stop date 01/26/21 8:36:00 CST, Lab Collect, Constipation  H/O: CVA (cerebrovascular accident)  Chronic neck and back pain, 01/26/21 8:36:00 CST  Clinic Follow-Up Wellness, *Est. 01/26/22 3:00:00 CST, Order for future visit, Wellness examination  Hypercholesterolemia  Constipation  H/O: CVA (cerebrovascular accident)  Chronic neck and back pain, [facility] AFP  Comprehensive Metabolic Panel, Routine collect, 01/26/21 8:36:00 CST, Blood, Order for  future visit, Stop date 01/26/21 8:36:00 CST, Lab Collect, Hypercholesterolemia  Constipation  H/O: CVA (cerebrovascular accident), 01/26/21 8:36:00 CST  Lab Collection Request, 01/26/21 8:36:00 CST, HLINK AMB - AFP, 01/26/21 8:36:00 CST, Wellness examination  Hypercholesterolemia  Constipation  H/O: CVA (cerebrovascular accident)  Chronic neck and back pain  Lipid Panel, Routine collect, 01/26/21 8:36:00 CST, Blood, Order for future visit, Stop date 01/26/21 8:36:00 CST, Lab Collect, Hypercholesterolemia  H/O: CVA (cerebrovascular accident), 01/26/21 8:36:00 CST  Medicare Annual Wellness- Subsequent  PC, Wellness examination  Hypercholesterolemia  Constipation  H/O: CVA (cerebrovascular accident)  Chronic neck and back pain, HLINK AMB - AFP, 01/26/21 8:36:00 CST  Urinalysis no Reflex, Routine collect, Urine, Order for future visit, 01/26/21 8:36:00 CST, Stop date 01/26/21 8:36:00 CST, Nurse collect, H/O: CVA (cerebrovascular accident)  Constipation  Hypercholesterolemia  ?  7.?Chronic neck and back pain?M54.2  ?sees Pain management--seeshyla Salazar  Ordered:  CBC w/ Auto Diff, Routine collect, 01/26/21 8:36:00 CST, Blood, Order for future visit, Stop date 01/26/21 8:36:00 CST, Lab Collect, Constipation  H/O: CVA (cerebrovascular accident)  Chronic neck and back pain, 01/26/21 8:36:00 CST  Clinic Follow-Up Wellness, *Est. 01/26/22 3:00:00 CST, Order for future visit, Wellness examination  Hypercholesterolemia  Constipation  H/O: CVA (cerebrovascular accident)  Chronic neck and back pain, [facility] AFP  Lab Collection Request, 01/26/21 8:36:00 CST, HLINK AMB - AFP, 01/26/21 8:36:00 CST, Wellness examination  Hypercholesterolemia  Constipation  H/O: CVA (cerebrovascular accident)  Chronic neck and back pain  Medicare Annual Wellness- Subsequent  PC, Wellness examination  Hypercholesterolemia  Constipation  H/O: CVA (cerebrovascular accident)  Chronic neck and back pain, HLINK AMB -  AFP, 01/26/21 8:36:00 CST  ?  8.?Thyroid nodule?E04.1  ?neg bx Dr Taylor 9/2020--check TSH  Ordered:  Thyroid Stimulating Hormone, Routine collect, 01/26/21 8:45:00 CST, Blood, Order for future visit, Stop date 01/26/21 8:45:00 CST, Lab Collect, Thyroid nodule, 01/26/21 8:45:00 CST  ?  Orders:  aspirin, 325 mg = 1 tab(s), Oral, Daily, # 30 tab(s), 11 Refill(s), other reason (Rx), 176.5, cm, Height/Length Dosing, 01/26/21 8:17:00 CST, 103.9, kg, Weight Dosing, 01/26/21 8:17:00 CST  atorvastatin, 20 mg = 1 tab(s), Oral, Daily, # 30 tab(s), 11 Refill(s), Pharmacy: [facility], 176.5, cm, Height/Length Dosing, 01/26/21 8:17:00 CST, 103.9, kg, Weight Dosing, 01/26/21 8:17:00 CST  linaclotide, 145 mcg = 1 cap(s), Oral, Daily, # 30 cap(s), 11 Refill(s), Pharmacy: [facility], 176.5, cm, Height/Length Dosing, 01/26/21 8:17:00 CST, 103.9, kg, Weight Dosing, 01/26/21 8:17:00 CST  Referrals  Clinic Follow-Up Wellness, *Est. 01/26/22 3:00:00 CST, Order for future visit, Wellness examination  Hypercholesterolemia  Constipation  H/O: CVA (cerebrovascular accident)  Chronic neck and back pain, [facility] AFP   Problem List/Past Medical History  Ongoing  ADL - activity of daily living  Bipolar  CVA (cerebral vascular accident)  Sleep apnea  Tobacco user  Historical  Anticoagulant therapy  Arthritis  Back pain  can lie flat  can walk 2 blocks briskly w/o CP/SOB  Chronic neck pain  Chronic neuropathy with no known cause or association  Constipation  disc disease  Hypercholesterolemia  Hypertension  numbness/tingling fingertips & tips of toes  orthostatic hypotension  Procedure/Surgical History  Excision of Left Pelvic Bone, Percutaneous Approach (01/29/2018)  Excision of Lumbar Vertebral Disc, Open Approach (01/29/2018)  Fusion of 2 or more Lumbar Vertebral Joints with Autologous Tissue Substitute, Posterior Approach, Posterior Column, Open Approach (01/29/2018)  Fusion of Lumbar Vertebral Joint with Interbody Fusion Device,  Posterior Approach, Anterior Column, Open Approach (01/29/2018)  Monitoring of Peripheral Nervous Electrical Activity, Intraoperative, External Approach (01/29/2018)  Release Lumbar Nerve, Open Approach (01/29/2018)  Fusion of lumbar spine (2018)  Colonoscopy (06/12/2017)  UMBILICAL HERNIA REPAIR (06/30/2015)  REVISION C3-C4 ACDF (03/25/2015)  Magnetic resonance imaging of brain and brain stem (02/06/2014)  Magnetic resonance imaging of other and unspecified sites (02/06/2014)  CERVICAL SPINAL FUSION (2009)  neck surgery X4  RIGHT KNEE RECONSTRUCTION  RIGHT KNEE REPLACEMENT  RIGHT ROTATOR CUFF REPAIR   Medications  Adult Quadrivalent Influenza Vaccine, 0.5 mL, IM, Once-Unscheduled  aspirin 325 mg oral tablet, 325 mg= 1 tab(s), Oral, Daily, 11 refills  atorvastatin 20 mg oral tablet, 20 mg= 1 tab(s), Oral, Daily, 11 refills  Linzess 145 mcg oral capsule, 145 mcg= 1 cap(s), Oral, Daily, 11 refills  LYRICA 150 MG CAPS, 150 mg= 1 cap(s), Oral, TID  morphine 30 mg/8 to 12 hr oral tablet, extended release, 30 mg= 1 tab(s), Oral, BID  NABUMETONE 750 MG TABS, 750 mg= 1 tab(s), Oral, BID  oxyCODONE 10 mg oral tablet, 10 mg= 1 tab(s), Oral, QID  Prevnar 13, 0.5 mL, IM, Once  Allergies  Demerol HCl  Flexeril  Social History  Abuse/Neglect  No, 01/26/2021  No, 07/16/2020  No, 01/22/2020  Alcohol - Medium Risk, 02/06/2014  Current, 01/22/2020  Employment/School  Disabled, Work/School description: SINCE 2007 due to neck and back., 01/22/2020  Home/Environment  Lives with Children, Significant other., 01/22/2020  Substance Use  Never, 01/25/2018  Tobacco - High Risk, 02/06/2014  10 or more cigarettes (1/2 pack or more)/day in last 30 days, No, 01/26/2021  10 or more cigarettes (1/2 pack or more)/day in last 30 days, N/A, 07/16/2020  10 or more cigarettes (1/2 pack or more)/day in last 30 days, N/A, 19 Years (Age started)., 01/22/2020  Current every day smoker, 42 year(s)., 01/25/2018  Family History  Alcoholism.: Mother.  Diabetes  mellitus type 2: Sister.  Immunizations  Vaccine Date Status   influenza virus vaccine, inactivated 02/07/2014 Given   poliovirus vaccine, live, trivalent 07/10/1961 Recorded   poliovirus vaccine, live, trivalent 07/15/1957 Recorded   poliovirus vaccine, live, trivalent 07/01/1957 Recorded   Health Maintenance  Health Maintenance  ???Pending?(in the next year)  ??? ??OverDue  ??? ? ? ?Influenza Vaccine due??10/01/20??and every 1??day(s)  ??? ??Due?  ??? ? ? ?Alcohol Misuse Screening due??01/02/21??and every 1??year(s)  ??? ? ? ?ADL Screening due??01/26/21??and every 1??year(s)  ??? ? ? ?Tetanus Vaccine due??01/26/21??and every 10??year(s)  ??? ? ? ?Zoster Vaccine due??01/26/21??Unknown Frequency  ??? ??Due In Future?  ??? ? ? ?Lung Cancer Screening not due until??02/28/21??and every 1??year(s)  ??? ? ? ?Diabetes Screening not due until??06/10/21??and every 1??year(s)  ??? ? ? ?Hypertension Management-BMP not due until??06/10/21??and every 1??year(s)  ??? ? ? ?Obesity Screening not due until??01/01/22??and every 1??year(s)  ??? ? ? ?Smoking Cessation not due until??01/01/22??and every 1??year(s)  ???Satisfied?(in the past 1 year)  ??? ??Satisfied?  ??? ? ? ?Aspirin Therapy for CVD Prevention on??01/26/21.??Satisfied by Oliver Wu MD  ??? ? ? ?Blood Pressure Screening on??01/26/21.??Satisfied by Lilliana Aldana CMA  ??? ? ? ?Body Mass Index Check on??01/26/21.??Satisfied by Lilliana Aldana CMA  ??? ? ? ?Coronary Artery Disease Maintenance-Antiplatelet Agent Prescribed on??01/26/21.??Satisfied by Oliver Wu MD  ??? ? ? ?Depression Screening on??01/26/21.??Satisfied by Oliver Wu MD  ??? ? ? ?Diabetes Screening on??06/10/20.??Satisfied by Claudia Farnsworth  ??? ? ? ?Hypertension Management-BMP on??06/10/20.??Satisfied by Claudia Farnsworth  ??? ? ? ?Hypertension Management-Blood Pressure on??01/26/21.??Satisfied by Lilliana Aldana CMA  ??? ? ? ?Influenza Vaccine on??01/26/21.??Satisfied by Bryce GALAVIZ,  Oliver WYMAN  ??? ? ? ?Lung Cancer Screening on??02/28/20.??Satisfied by Gerardo Eugene RT, Esvin HARDY.  ??? ? ? ?Medicare Annual Wellness Exam on??01/26/21.??Satisfied by Oliver Wu MD  ??? ? ? ?Obesity Screening on??01/26/21.??Satisfied by Lilliana Aldana CMA  ??? ? ? ?Smoking Cessation on??01/26/21.??Satisfied by Oliver Wu MD  ?

## 2023-02-15 PROBLEM — E78.00 HIGH CHOLESTEROL: Status: ACTIVE | Noted: 2023-02-15

## 2023-02-15 PROBLEM — R73.9 HYPERGLYCEMIA: Status: ACTIVE | Noted: 2023-02-15

## 2023-02-15 PROBLEM — K59.03 DRUG-INDUCED CONSTIPATION: Status: ACTIVE | Noted: 2023-02-15

## 2023-02-15 PROBLEM — Z12.5 PROSTATE CANCER SCREENING: Status: ACTIVE | Noted: 2023-02-15

## 2023-02-15 PROBLEM — I63.9 CEREBROVASCULAR ACCIDENT: Status: ACTIVE | Noted: 2023-02-15

## 2023-02-15 PROBLEM — Z23 IMMUNIZATION DUE: Status: ACTIVE | Noted: 2023-02-15

## 2023-02-15 PROBLEM — E04.1 THYROID NODULE: Status: ACTIVE | Noted: 2023-02-15

## 2023-02-15 PROBLEM — G47.30 SLEEP APNEA: Status: ACTIVE | Noted: 2023-02-15

## 2023-02-15 PROBLEM — Z00.00 ENCOUNTER FOR WELLNESS EXAMINATION IN ADULT: Status: ACTIVE | Noted: 2023-02-15

## 2023-02-15 PROBLEM — Z72.0 TOBACCO USER: Status: ACTIVE | Noted: 2023-02-15

## 2023-02-15 PROBLEM — Z87.39 HISTORY OF SPINAL STENOSIS: Status: ACTIVE | Noted: 2018-03-26

## 2023-02-16 PROBLEM — F32.A ANXIETY AND DEPRESSION: Status: ACTIVE | Noted: 2023-02-16

## 2023-02-16 PROBLEM — F41.9 ANXIETY AND DEPRESSION: Status: ACTIVE | Noted: 2023-02-16

## 2023-03-21 ENCOUNTER — HOSPITAL ENCOUNTER (OUTPATIENT)
Dept: RADIOLOGY | Facility: HOSPITAL | Age: 68
Discharge: HOME | End: 2023-03-21
Attending: FAMILY MEDICINE
Payer: MEDICARE

## 2023-03-21 DIAGNOSIS — Z72.0 TOBACCO USER: ICD-10-CM

## 2023-03-21 DIAGNOSIS — Z87.891 SMOKING HX: ICD-10-CM

## 2023-03-21 PROCEDURE — 71271 CT THORAX LUNG CANCER SCR C-: CPT | Mod: TC

## 2023-03-21 NOTE — PROGRESS NOTES
Please notify patient  that the radiologist read the  low dose CT of lung as normal.  I recommend repeating test annually for lung cancer screening.  We can set up again next year at annual physical.

## 2023-05-22 PROBLEM — Z00.00 ENCOUNTER FOR WELLNESS EXAMINATION IN ADULT: Status: RESOLVED | Noted: 2023-02-15 | Resolved: 2023-05-22

## 2023-05-22 PROBLEM — I63.9 CEREBROVASCULAR ACCIDENT: Status: RESOLVED | Noted: 2023-02-15 | Resolved: 2023-05-22

## 2024-02-19 PROBLEM — J44.9 CHRONIC OBSTRUCTIVE PULMONARY DISEASE, UNSPECIFIED COPD TYPE: Status: ACTIVE | Noted: 2024-02-19

## 2024-02-19 PROBLEM — I70.0 AORTIC ATHEROSCLEROSIS: Status: ACTIVE | Noted: 2024-02-19

## 2024-02-19 PROBLEM — N40.0 BENIGN PROSTATIC HYPERPLASIA: Status: ACTIVE | Noted: 2024-02-19

## 2024-03-22 ENCOUNTER — HOSPITAL ENCOUNTER (OUTPATIENT)
Dept: RADIOLOGY | Facility: HOSPITAL | Age: 69
Discharge: HOME | End: 2024-03-22
Attending: FAMILY MEDICINE
Payer: MEDICARE

## 2024-03-22 DIAGNOSIS — Z87.891 PERSONAL HISTORY OF TOBACCO USE, PRESENTING HAZARDS TO HEALTH: ICD-10-CM

## 2024-03-22 PROCEDURE — 71271 CT THORAX LUNG CANCER SCR C-: CPT | Mod: TC

## 2024-05-20 PROBLEM — Z00.00 ENCOUNTER FOR WELLNESS EXAMINATION IN ADULT: Status: RESOLVED | Noted: 2023-02-15 | Resolved: 2024-05-20

## 2024-11-26 PROBLEM — E66.01 SEVERE OBESITY (BMI 35.0-39.9) WITH COMORBIDITY: Status: ACTIVE | Noted: 2024-11-26

## 2025-01-02 PROBLEM — M87.052 AVASCULAR NECROSIS OF BONE OF LEFT HIP: Status: ACTIVE | Noted: 2025-01-02

## 2025-01-02 PROCEDURE — 85384 FIBRINOGEN ACTIVITY: CPT | Performed by: FAMILY MEDICINE

## 2025-01-02 PROCEDURE — 85730 THROMBOPLASTIN TIME PARTIAL: CPT | Performed by: FAMILY MEDICINE

## 2025-01-02 PROCEDURE — 85652 RBC SED RATE AUTOMATED: CPT | Performed by: FAMILY MEDICINE

## 2025-01-02 PROCEDURE — 85610 PROTHROMBIN TIME: CPT | Performed by: FAMILY MEDICINE
